# Patient Record
Sex: FEMALE | Race: WHITE | NOT HISPANIC OR LATINO | URBAN - METROPOLITAN AREA
[De-identification: names, ages, dates, MRNs, and addresses within clinical notes are randomized per-mention and may not be internally consistent; named-entity substitution may affect disease eponyms.]

---
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
## 2019-05-02 ENCOUNTER — COMMUNICATION - HEALTHEAST (OUTPATIENT)
Dept: FAMILY MEDICINE | Facility: CLINIC | Age: 19
End: 2019-05-02

## 2019-05-02 DIAGNOSIS — F41.9 ANXIETY: ICD-10-CM

## 2019-05-02 RX ORDER — ESCITALOPRAM OXALATE 10 MG/1
TABLET ORAL
Qty: 30 TABLET | Refills: 0 | Status: SHIPPED | OUTPATIENT
Start: 2019-05-02

## 2021-05-28 ENCOUNTER — RECORDS - HEALTHEAST (OUTPATIENT)
Dept: ADMINISTRATIVE | Facility: CLINIC | Age: 21
End: 2021-05-28

## 2021-05-28 NOTE — TELEPHONE ENCOUNTER
RN cannot approve Refill Request    RN can NOT refill this medication overdue for office visits and/or labs.    Kosta Maradiaga, [facility] Triage/Med Refill 5/2/2019    Requested Prescriptions   Pending Prescriptions Disp Refills     escitalopram oxalate (LEXAPRO) 10 MG tablet [Pharmacy Med Name: ESCITALOPRAM 10 MG TABLET] 30 tablet 0     Sig: TAKE 1 TABLET BY MOUTH EVERY DAY       [facility] Refill Protocol  Failed - 5/2/2019 12:20 PM        Failed - Age 21 and younger route to prescribing provider     Last office visit with prescriber/PCP: 5/21/2018 Lenore Call MD OR same dept: 5/21/2018 Lenore Call MD OR same specialty: 5/21/2018 Lenore Call MD  Last physical: Visit date not found Last MTM visit: Visit date not found   Next visit within 3 mo: Visit date not found  Next physical within 3 mo: Visit date not found  Prescriber OR PCP: Lenore Call MD  Last diagnosis associated with med order: 1. Anxiety  - escitalopram oxalate (LEXAPRO) 10 MG tablet [Pharmacy Med Name: ESCITALOPRAM 10 MG TABLET]; TAKE 1 TABLET BY MOUTH EVERY DAY  Dispense: 30 tablet; Refill: 0    If protocol passes may refill for 12 months if within 3 months of last provider visit (or a total of 15 months).

## 2021-05-28 NOTE — TELEPHONE ENCOUNTER
2nd Attempt: LMTCB to schedule physical appointment ok to schedule with partner    [id number] Comprehensive

## 2021-05-30 VITALS — BODY MASS INDEX: 18.56 KG/M2 | HEIGHT: 69 IN | WEIGHT: 125.31 LBS

## 2021-05-31 VITALS — WEIGHT: 141.31 LBS | BODY MASS INDEX: 20.87 KG/M2

## 2021-06-01 VITALS — WEIGHT: 140.38 LBS | BODY MASS INDEX: 20.73 KG/M2

## 2021-06-09 NOTE — PROGRESS NOTES
ASSESSMENT & PLAN:  1. Cold hands and feet  Thyroid [place]   2. Eczema     3. Seasonal allergies         Patient Instructions   Triamcinolone 0.5% max 2-3 times per day for 2-3 weeks as needed for eczema.     Tdap due in 2020.     Meningitis vaccine given today.     Cipro, if needed, twice daily for 10 days for severe travelers diarrhea.    Thyroid function checked today.        Orders Placed This Encounter   Procedures     Meningococcal MCV4P     Order Specific Question:   Counseling provided to include answering patients questions and/or preemptively discussing the risks and benefits of all components.     Answer:   Yes     Thyroid [place]     There are no discontinued medications.    No Follow-up on file.    CHIEF COMPLAINT:  Chief Complaint   Patient presents with     Establish Care     est care- discuss acne, cold extremity       HISTORY OF PRESENT ILLNESS:  Elda is a 16 y.o. female presenting to the clinic today to establish care and discuss a few things.  She is currently taking clindamycin gel and adapalene gel topical medications. She would like to continue the same medications at this time.    URI: Third upper respiratory infection in the last 1.5 months. Her mother says her first illness was probably influenza. Her recent URIs have made her mood lower than usual. Her latest URI has resolved. Previously, she has been a very healthy individual. She had never missed a day of school for illness before, but was out of school with this illness. Her dad had similar URI and influenza-like symptoms.  Initially mom thought she might want to be checked for an immune deficiency but does not feel that is necessary at this time.    Depression: She was previously planning on discussing depressive symptoms at this visit, but now has no concerns of depression at this time. Her mood has improved since she recovered from the series of URI's, as mentioned above. Her PHQ-9 score today is 1.    Eczema: Her eczema goes away  "in the summer and returns in the winter. She would apply lotion in the morning before school, and her hands would be dry again by the afternoon. Today she has some redness and swelling of the backs of her hands and knuckles.     Cold Extremities: Recently, her hands and feet have always been cold. She denies noticing her hands turn white or purple when she is exposed to cold.   Her mother  And brother have hypothyroidism.     REVIEW OF SYSTEMS:   All other systems are negative.    PFSH:  She has a history of spring and summer seasonal allergies, for which she takes cetirizine as needed. She is traveling to [place] next week.     TOBACCO USE:  History   Smoking Status     Never Smoker   Smokeless Tobacco     Not on file       VITALS:  Vitals:    03/08/17 1607   BP: 100/72   Patient Site: Right Arm   Patient Position: Sitting   Cuff Size: Adult Regular   Pulse: 80   Resp: 16   Temp: 97.6  F (36.4  C)   TempSrc: Oral   Weight: 125 lb 5 oz (56.8 kg)   Height: 5' 9\" (1.753 m)     Wt Readings from Last 3 Encounters:   03/08/17 125 lb 5 oz (56.8 kg) (53 %, Z= 0.08)*     * Growth percentiles are based on CDC 2-20 Years data.     Body mass index is 18.51 kg/(m^2).    PHYSICAL EXAM:  General Appearance: Alert, cooperative, no distress, appears stated age, affect normal  Lungs: Clear to auscultation bilaterally, respirations unlabored  Heart: Regular rate and rhythm, S1 and S2 normal, no murmur, rub   or gallop  Skin: Skin color, texture, turgor normal, no lesions. Some dry red patches on dorsal hands bilaterally.    QUALITY MEASURES:  The following are part of a depression follow up plan for the patient:  mental health screening assessment    ADDITIONAL HISTORY SUMMARIZED (2): None.  DECISION TO OBTAIN EXTRA INFORMATION (1): None.   RADIOLOGY TESTS (1): None.  LABS (1): None.  MEDICINE TESTS (1): None.  INDEPENDENT REVIEW (2 each): None.     The visit lasted a total of 30 minutes face to face with the patient. Over 50% of the " time was spent counseling and educating the patient about URI, acne, and eczema.    I, Samantha Guerrier, am scribing for and in the presence of, Dr. Call.    I, Dr. Call personally performed the services described in this documentation, as scribed by Samantha Guerrier in my presence, and it is both accurate and complete.    MEDICATIONS:  Current Outpatient Prescriptions   Medication Sig Dispense Refill     adapalene (DIFFERIN) 0.1 % gel Apply topically bedtime.       cetirizine (ZYRTEC) 10 MG tablet Take 10 mg by mouth daily.       clindamycin (CLINDAGEL) 1 % gel Apply topically 2 (two) times a day.       ciprofloxacin HCl (CIPRO) 500 MG tablet Take 1 tablet (500 mg total) by mouth 2 (two) times a day for 10 days. 20 tablet 0     triamcinolone (KENALOG) 0.5 % ointment Apply to eczema  On hands 2-3 times a day for max of 2 weeks. Not on face. 30 g 1     No current facility-administered medications for this visit.        Total data points: 0

## 2021-06-14 NOTE — PROGRESS NOTES
ASSESSMENT & PLAN:  1. Anxiety and depression     2. Contraceptive management         Patient Instructions   [facility] in [place]- #[phone], set up counseling    Starting birth control. Back up protection for one month. You can start taking it this Saturday or Sunday.     Start taking Lexapro, 5 mg once daily for 6 days, then increase to 10 mg once daily from there on out.     Follow up in 1-2 months for a medication check. If you can schedule a physical at that time, it would be great! Otherwise we can do the physical at a later time, maybe this summer, come fasting.    No orders of the defined types were placed in this encounter.    There are no discontinued medications.    Return in about 2 months (around 2/21/2018) for f/u depression, schedule physical if possible.    CHIEF COMPLAINT:  Chief Complaint   Patient presents with     Depression     would like to discuss going on an antidepressant        HISTORY OF PRESENT ILLNESS:  Elda is a 17 y.o. female presenting to the clinic today to discuss depression. She is interested in discussing going on an antidepressant at this time. Her symptoms began when she started college. She did have some seasonal depression prior to starting college. The transition from high school to university has been stressful as she just finished her first semester at the [employer]. She has not done any counseling. She has never been on an antidepressant in the past. She denies any thoughts of hurting herself or others. She had a NATIVIDAD-7 score of 14 today.     Little interest or pleasure in doing things: Several days  Feeling down, depressed, or hopeless: More than half the days  Trouble falling or staying asleep, or sleeping too much: More than half the days  Feeling tired or having little energy: Nearly every day  Poor appetite or overeating: Nearly every day  Feeling bad about yourself - or that you are a failure or have let yourself or your  family down: Several days  Trouble concentrating on things, such as reading the newspaper or watching television: Several days  Moving or speaking so slowly that other people could have noticed. Or the opposite - being so fidgety or restless that you have been moving around a lot more than usual: Not at all  Thoughts that you would be better off dead, or of hurting yourself in some way: Not at all  PHQ-9 Total Score: 13  If you checked off any problems, how difficult have these problems made it for you to do your work, take care of things at home, or get along with other people?: Very difficult  Depression Follow-up Plan: implementation of measures to provide psychological support;management of mental health treatment;patient follow-up to return when and if necessary    Irregular Periods/Contraception: She is currently sexually active but is not currently on birth control. She is interested in discussing options at this time. Of note, she has been having irregular periods. There was one period of time where she went 50 days in between her periods. She has been tracking her periods and they have been occurring every 30, 25, 35, 25, 50, 20, and 25 days. She uses condoms. Her LMP was three days ago.       REVIEW OF SYSTEMS:   She does not suffer from migraine headaches.   All other systems are negative.    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 She has entire month off for winter break.   Family: Her brother is currently taking Lexapro and it has been successful for him. Her mother has depression.     TOBACCO USE:  History   Smoking Status     Never Smoker   Smokeless Tobacco     Not on file       VITALS:  Vitals:    12/21/17 1400   BP: 104/62   Patient Site: Left Arm   Patient Position: Sitting   Cuff Size: Adult Regular   Pulse: 70   Resp: 14   Weight: 141 lb 5 oz (64.1 kg)     Wt Readings from Last 3 Encounters:   12/21/17 141 lb 5 oz (64.1 kg) (74 %, Z= 0.66)*   03/08/17 125 lb 5 oz  "(56.8 kg) (53 %, Z= 0.08)*     * Growth percentiles are based on [facility] 2-20 Years data.     Estimated body mass index is 18.51 kg/(m^2) as calculated from the following:    Height as of 3/8/17: 5' 9\" (1.753 m).    Weight as of 3/8/17: 125 lb 5 oz (56.8 kg).    PHYSICAL EXAM:  General appearance: alert, appears stated age, cooperative and reserved.  Neurologic: Grossly normal  Psych: Bright affect, mood normal, no suicidal or homicidal ideation.     QUALITY MEASURES:  The following are part of a depression follow up plan for the patient:  implementation of measures to provide psychological support, management of mental health treatment and patient follow-up to return when and if necessary    ADDITIONAL HISTORY SUMMARIZED (2): None.  DECISION TO OBTAIN EXTRA INFORMATION (1): None.   RADIOLOGY TESTS (1): None.  LABS (1): None.  MEDICINE TESTS (1): None.  INDEPENDENT REVIEW (2 each): None.     The visit lasted a total of 12 minutes face to face with the patient. Over 50% of the time was spent counseling and educating the patient about depression.    IEmma, am scribing for and in the presence of, Dr. Lenore Call.  I, Dr. Lenore Call MD, personally performed the services described in this documentation, as scribed by Emma Stringer in my presence, and it is both accurate and complete.  Total     MEDICATIONS:  Current Outpatient Prescriptions   Medication Sig Dispense Refill     adapalene (DIFFERIN) 0.1 % gel Apply topically bedtime.       cetirizine (ZYRTEC) 10 MG tablet Take 10 mg by mouth daily.       clindamycin (CLINDAGEL) 1 % gel Apply topically 2 (two) times a day.       triamcinolone (KENALOG) 0.5 % ointment Apply to eczema  On hands 2-3 times a day for max of 2 weeks. Not on face. 30 g 1     escitalopram oxalate (LEXAPRO) 10 MG tablet Take 1 tablet (10 mg total) by mouth daily. 30 tablet 2     levonorgestrel-ethinyl estradiol (AVIANE,ALESSE,LESSINA) 0.1-20 mg-mcg per tablet Take 1 " tablet by mouth daily. 84 tablet 3     No current facility-administered medications for this visit.      Total data points: None.

## 2021-06-15 PROBLEM — L30.9 ECZEMA: Status: ACTIVE | Noted: 2017-03-08

## 2021-06-15 PROBLEM — J30.2 SEASONAL ALLERGIES: Status: ACTIVE | Noted: 2017-03-08

## 2021-06-16 PROBLEM — F41.9 ANXIETY AND DEPRESSION: Status: ACTIVE | Noted: 2018-05-22

## 2021-06-16 PROBLEM — F32.A ANXIETY AND DEPRESSION: Status: ACTIVE | Noted: 2018-05-22

## 2021-06-18 NOTE — PROGRESS NOTES
ASSESSMENT & PLAN:  1. Anxiety and depression     2. Anxiety  escitalopram oxalate (LEXAPRO) 10 MG tablet       Patient Instructions   Psychology Referral: [facility] [phone]  [facility] [place] [address]     Change Lexapro to bedtime.     Set physical in August. Come fasting.     After physical, medication check in 6 months after.        No orders of the defined types were placed in this encounter.    Medications Discontinued During This Encounter   Medication Reason     escitalopram oxalate (LEXAPRO) 10 MG tablet Reorder       Return in about 3 months (around 8/21/2018) for Annual physical.    CHIEF COMPLAINT:  Chief Complaint   Patient presents with     Medication Management     med check- lexapro       HISTORY OF PRESENT ILLNESS:  Elda is a 18 y.o. female presenting to the clinic today for depression and  anxiety. She fells much better overall since starting the Lexapro. She feels better able to concentrate in college.      Anxiety: PHQ-9 was 5 and NATIVIDAD-7 was 8 in clinic. Her mood has evened out since starting Lexapro 10 mg daily. She has not looked into counseling since she was busy with school. She is interested in starting counseling this summer. She denies thoughts of hurting herself and others. She feels fatigued with medication and takes it in the morning. She denies adverse side effects to medication.     REVIEW OF SYSTEMS:   She is sexually active and uses condoms. She denies family history of breast cancer. All other systems are negative.    PFSH:  Personal: She finished her first year of college. 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     TOBACCO USE:  History   Smoking Status     Never Smoker   Smokeless Tobacco     Never Used       VITALS:  Vitals:    05/21/18 1614   BP: 100/74   Patient Site: Left Arm   Patient Position: Sitting   Cuff Size: Adult Regular   Pulse: 88   Resp: 16   Temp: 98.4  F (36.9  C)   TempSrc: Oral   Weight: 140 lb 6 oz (63.7 kg)      Wt Readings from Last 3 Encounters:   05/21/18 140 lb 6 oz (63.7 kg) (72 %, Z= 0.58)*   12/21/17 141 lb 5 oz (64.1 kg) (74 %, Z= 0.66)*   03/08/17 125 lb 5 oz (56.8 kg) (53 %, Z= 0.08)*     * Growth percentiles are based on [facility] 2-20 Years data.     There is no height or weight on file to calculate BMI.    PHYSICAL EXAM:  /74 (Patient Site: Left Arm, Patient Position: Sitting, Cuff Size: Adult Regular)  Pulse 88  Temp 98.4  F (36.9  C) (Oral)   Resp 16  Wt 140 lb 6 oz (63.7 kg)  General appearance: alert, appears stated age and cooperative  Neurologic: Grossly normal   Psych: Normal mood and affect.       QUALITY MEASURES:    ADDITIONAL HISTORY SUMMARIZED (2): None.  DECISION TO OBTAIN EXTRA INFORMATION (1): None.   RADIOLOGY TESTS (1): None.  LABS (1): None.  MEDICINE TESTS (1): None.  INDEPENDENT REVIEW (2 each): None.     The visit lasted a total of 15 minutes face to face with the patient. Over 50% of the time was spent counseling and educating the patient about depression and anxiety.     I, Leatha Ron, am scribing for and in the presence of, Dr. Lenore Call.    I, Dr. Lenore Call MD, personally performed the services described in this documentation, as scribed by Leatha Ron in my presence, and it is both accurate and complete.    MEDICATIONS:  Current Outpatient Prescriptions   Medication Sig Dispense Refill     adapalene (DIFFERIN) 0.1 % gel Apply topically bedtime.       cetirizine (ZYRTEC) 10 MG tablet Take 10 mg by mouth daily.       clindamycin (CLINDAGEL) 1 % gel Apply topically 2 (two) times a day.       escitalopram oxalate (LEXAPRO) 10 MG tablet Take 1 tablet (10 mg total) by mouth daily. 30 tablet 2     levonorgestrel-ethinyl estradiol (AVIANE,ALESSE,LESSINA) 0.1-20 mg-mcg per tablet Take 1 tablet by mouth daily. 84 tablet 3     triamcinolone (KENALOG) 0.5 % ointment Apply to eczema  On hands 2-3 times a day for max of 2 weeks. Not on face. 30 g 1     No current  facility-administered medications for this visit.        Total data points: 0

## 2021-06-22 NOTE — PROGRESS NOTES
Received fax from  of  disability resource [place] with a signed FARHANA from pt,  to release OV notes from 12/2017 to now. Records faxed.